# Patient Record
Sex: FEMALE | Race: OTHER | HISPANIC OR LATINO | URBAN - METROPOLITAN AREA
[De-identification: names, ages, dates, MRNs, and addresses within clinical notes are randomized per-mention and may not be internally consistent; named-entity substitution may affect disease eponyms.]

---

## 2020-02-14 ENCOUNTER — OUTPATIENT (OUTPATIENT)
Dept: OUTPATIENT SERVICES | Facility: HOSPITAL | Age: 30
LOS: 1 days | End: 2020-02-14
Payer: MEDICAID

## 2020-02-14 DIAGNOSIS — O26.899 OTHER SPECIFIED PREGNANCY RELATED CONDITIONS, UNSPECIFIED TRIMESTER: ICD-10-CM

## 2020-02-14 DIAGNOSIS — Z3A.00 WEEKS OF GESTATION OF PREGNANCY NOT SPECIFIED: ICD-10-CM

## 2020-02-14 PROCEDURE — 59025 FETAL NON-STRESS TEST: CPT

## 2020-02-14 PROCEDURE — G0463: CPT

## 2020-02-14 PROCEDURE — 59025 FETAL NON-STRESS TEST: CPT | Mod: 26

## 2020-02-16 ENCOUNTER — INPATIENT (INPATIENT)
Facility: HOSPITAL | Age: 30
LOS: 2 days | Discharge: HOME | End: 2020-02-19
Attending: OBSTETRICS & GYNECOLOGY | Admitting: OBSTETRICS & GYNECOLOGY
Payer: MEDICAID

## 2020-02-16 VITALS — HEIGHT: 60 IN | WEIGHT: 174.17 LBS

## 2020-02-16 DIAGNOSIS — O26.899 OTHER SPECIFIED PREGNANCY RELATED CONDITIONS, UNSPECIFIED TRIMESTER: ICD-10-CM

## 2020-02-16 DIAGNOSIS — Z34.80 ENCOUNTER FOR SUPERVISION OF OTHER NORMAL PREGNANCY, UNSPECIFIED TRIMESTER: ICD-10-CM

## 2020-02-16 DIAGNOSIS — Z3A.00 WEEKS OF GESTATION OF PREGNANCY NOT SPECIFIED: ICD-10-CM

## 2020-02-16 LAB
APTT BLD: 26.4 SEC — LOW (ref 27.5–36.3)
BASOPHILS # BLD AUTO: 0.06 K/UL — SIGNIFICANT CHANGE UP (ref 0–0.2)
BASOPHILS NFR BLD AUTO: 0.5 % — SIGNIFICANT CHANGE UP (ref 0–2)
EOSINOPHIL # BLD AUTO: 0.14 K/UL — SIGNIFICANT CHANGE UP (ref 0–0.5)
EOSINOPHIL NFR BLD AUTO: 1.1 % — SIGNIFICANT CHANGE UP (ref 0–6)
FIBRINOGEN PPP-MCNC: 802 MG/DL — HIGH (ref 350–510)
HCT VFR BLD CALC: 40 % — SIGNIFICANT CHANGE UP (ref 34.5–45)
HGB BLD-MCNC: 13.4 G/DL — SIGNIFICANT CHANGE UP (ref 11.5–15.5)
IMM GRANULOCYTES NFR BLD AUTO: 0.3 % — SIGNIFICANT CHANGE UP (ref 0–1.5)
INR BLD: 0.89 RATIO — SIGNIFICANT CHANGE UP (ref 0.88–1.16)
LYMPHOCYTES # BLD AUTO: 26.2 % — SIGNIFICANT CHANGE UP (ref 13–44)
LYMPHOCYTES # BLD AUTO: 3.2 K/UL — SIGNIFICANT CHANGE UP (ref 1–3.3)
MCHC RBC-ENTMCNC: 31.3 PG — SIGNIFICANT CHANGE UP (ref 27–34)
MCHC RBC-ENTMCNC: 33.5 GM/DL — SIGNIFICANT CHANGE UP (ref 32–36)
MCV RBC AUTO: 93.5 FL — SIGNIFICANT CHANGE UP (ref 80–100)
MONOCYTES # BLD AUTO: 1.05 K/UL — HIGH (ref 0–0.9)
MONOCYTES NFR BLD AUTO: 8.6 % — SIGNIFICANT CHANGE UP (ref 2–14)
NEUTROPHILS # BLD AUTO: 7.73 K/UL — HIGH (ref 1.8–7.4)
NEUTROPHILS NFR BLD AUTO: 63.3 % — SIGNIFICANT CHANGE UP (ref 43–77)
NRBC # BLD: 0 /100 WBCS — SIGNIFICANT CHANGE UP (ref 0–0)
PLATELET # BLD AUTO: 276 K/UL — SIGNIFICANT CHANGE UP (ref 150–400)
PROTHROM AB SERPL-ACNC: 9.8 SEC — LOW (ref 10–12.9)
RBC # BLD: 4.28 M/UL — SIGNIFICANT CHANGE UP (ref 3.8–5.2)
RBC # FLD: 14.3 % — SIGNIFICANT CHANGE UP (ref 10.3–14.5)
WBC # BLD: 12.22 K/UL — HIGH (ref 3.8–10.5)
WBC # FLD AUTO: 12.22 K/UL — HIGH (ref 3.8–10.5)

## 2020-02-16 RX ORDER — OXYTOCIN 10 UNIT/ML
333.33 VIAL (ML) INJECTION
Qty: 20 | Refills: 0 | Status: DISCONTINUED | OUTPATIENT
Start: 2020-02-16 | End: 2020-02-19

## 2020-02-16 RX ORDER — CITRIC ACID/SODIUM CITRATE 300-500 MG
15 SOLUTION, ORAL ORAL EVERY 6 HOURS
Refills: 0 | Status: DISCONTINUED | OUTPATIENT
Start: 2020-02-16 | End: 2020-02-17

## 2020-02-16 RX ORDER — SODIUM CHLORIDE 9 MG/ML
1000 INJECTION, SOLUTION INTRAVENOUS
Refills: 0 | Status: DISCONTINUED | OUTPATIENT
Start: 2020-02-16 | End: 2020-02-17

## 2020-02-16 RX ADMIN — SODIUM CHLORIDE 125 MILLILITER(S): 9 INJECTION, SOLUTION INTRAVENOUS at 23:27

## 2020-02-16 RX ADMIN — SODIUM CHLORIDE 125 MILLILITER(S): 9 INJECTION, SOLUTION INTRAVENOUS at 21:00

## 2020-02-17 LAB
BASOPHILS # BLD AUTO: 0.04 K/UL — SIGNIFICANT CHANGE UP (ref 0–0.2)
BASOPHILS NFR BLD AUTO: 0.3 % — SIGNIFICANT CHANGE UP (ref 0–2)
EOSINOPHIL # BLD AUTO: 0.1 K/UL — SIGNIFICANT CHANGE UP (ref 0–0.5)
EOSINOPHIL NFR BLD AUTO: 0.7 % — SIGNIFICANT CHANGE UP (ref 0–6)
HCT VFR BLD CALC: 35.7 % — SIGNIFICANT CHANGE UP (ref 34.5–45)
HGB BLD-MCNC: 11.5 G/DL — SIGNIFICANT CHANGE UP (ref 11.5–15.5)
IMM GRANULOCYTES NFR BLD AUTO: 0.5 % — SIGNIFICANT CHANGE UP (ref 0–1.5)
LYMPHOCYTES # BLD AUTO: 2.82 K/UL — SIGNIFICANT CHANGE UP (ref 1–3.3)
LYMPHOCYTES # BLD AUTO: 21 % — SIGNIFICANT CHANGE UP (ref 13–44)
MCHC RBC-ENTMCNC: 30.8 PG — SIGNIFICANT CHANGE UP (ref 27–34)
MCHC RBC-ENTMCNC: 32.2 GM/DL — SIGNIFICANT CHANGE UP (ref 32–36)
MCV RBC AUTO: 95.7 FL — SIGNIFICANT CHANGE UP (ref 80–100)
MONOCYTES # BLD AUTO: 1.14 K/UL — HIGH (ref 0–0.9)
MONOCYTES NFR BLD AUTO: 8.5 % — SIGNIFICANT CHANGE UP (ref 2–14)
NEUTROPHILS # BLD AUTO: 9.23 K/UL — HIGH (ref 1.8–7.4)
NEUTROPHILS NFR BLD AUTO: 69 % — SIGNIFICANT CHANGE UP (ref 43–77)
NRBC # BLD: 0 /100 WBCS — SIGNIFICANT CHANGE UP (ref 0–0)
PLATELET # BLD AUTO: 244 K/UL — SIGNIFICANT CHANGE UP (ref 150–400)
RBC # BLD: 3.73 M/UL — LOW (ref 3.8–5.2)
RBC # FLD: 14.6 % — HIGH (ref 10.3–14.5)
T PALLIDUM AB TITR SER: NEGATIVE — SIGNIFICANT CHANGE UP
WBC # BLD: 13.4 K/UL — HIGH (ref 3.8–10.5)
WBC # FLD AUTO: 13.4 K/UL — HIGH (ref 3.8–10.5)

## 2020-02-17 RX ORDER — OXYCODONE HYDROCHLORIDE 5 MG/1
5 TABLET ORAL
Refills: 0 | Status: DISCONTINUED | OUTPATIENT
Start: 2020-02-17 | End: 2020-02-19

## 2020-02-17 RX ORDER — AER TRAVELER 0.5 G/1
1 SOLUTION RECTAL; TOPICAL EVERY 4 HOURS
Refills: 0 | Status: DISCONTINUED | OUTPATIENT
Start: 2020-02-17 | End: 2020-02-19

## 2020-02-17 RX ORDER — IBUPROFEN 200 MG
600 TABLET ORAL EVERY 6 HOURS
Refills: 0 | Status: DISCONTINUED | OUTPATIENT
Start: 2020-02-17 | End: 2020-02-19

## 2020-02-17 RX ORDER — HYDROCORTISONE 1 %
1 OINTMENT (GRAM) TOPICAL EVERY 6 HOURS
Refills: 0 | Status: DISCONTINUED | OUTPATIENT
Start: 2020-02-17 | End: 2020-02-19

## 2020-02-17 RX ORDER — LANOLIN
1 OINTMENT (GRAM) TOPICAL EVERY 6 HOURS
Refills: 0 | Status: DISCONTINUED | OUTPATIENT
Start: 2020-02-17 | End: 2020-02-19

## 2020-02-17 RX ORDER — GLYCERIN ADULT
1 SUPPOSITORY, RECTAL RECTAL AT BEDTIME
Refills: 0 | Status: DISCONTINUED | OUTPATIENT
Start: 2020-02-17 | End: 2020-02-19

## 2020-02-17 RX ORDER — PRAMOXINE HYDROCHLORIDE 150 MG/15G
1 AEROSOL, FOAM RECTAL EVERY 4 HOURS
Refills: 0 | Status: DISCONTINUED | OUTPATIENT
Start: 2020-02-17 | End: 2020-02-19

## 2020-02-17 RX ORDER — DIBUCAINE 1 %
1 OINTMENT (GRAM) RECTAL EVERY 6 HOURS
Refills: 0 | Status: DISCONTINUED | OUTPATIENT
Start: 2020-02-17 | End: 2020-02-19

## 2020-02-17 RX ORDER — SODIUM CHLORIDE 9 MG/ML
3 INJECTION INTRAMUSCULAR; INTRAVENOUS; SUBCUTANEOUS EVERY 8 HOURS
Refills: 0 | Status: DISCONTINUED | OUTPATIENT
Start: 2020-02-17 | End: 2020-02-19

## 2020-02-17 RX ORDER — SIMETHICONE 80 MG/1
80 TABLET, CHEWABLE ORAL EVERY 4 HOURS
Refills: 0 | Status: DISCONTINUED | OUTPATIENT
Start: 2020-02-17 | End: 2020-02-19

## 2020-02-17 RX ORDER — TETANUS TOXOID, REDUCED DIPHTHERIA TOXOID AND ACELLULAR PERTUSSIS VACCINE, ADSORBED 5; 2.5; 8; 8; 2.5 [IU]/.5ML; [IU]/.5ML; UG/.5ML; UG/.5ML; UG/.5ML
0.5 SUSPENSION INTRAMUSCULAR ONCE
Refills: 0 | Status: DISCONTINUED | OUTPATIENT
Start: 2020-02-17 | End: 2020-02-19

## 2020-02-17 RX ORDER — MAGNESIUM HYDROXIDE 400 MG/1
30 TABLET, CHEWABLE ORAL
Refills: 0 | Status: DISCONTINUED | OUTPATIENT
Start: 2020-02-17 | End: 2020-02-19

## 2020-02-17 RX ORDER — BENZOCAINE 10 %
1 GEL (GRAM) MUCOUS MEMBRANE EVERY 6 HOURS
Refills: 0 | Status: DISCONTINUED | OUTPATIENT
Start: 2020-02-17 | End: 2020-02-19

## 2020-02-17 RX ORDER — ACETAMINOPHEN 500 MG
975 TABLET ORAL EVERY 6 HOURS
Refills: 0 | Status: DISCONTINUED | OUTPATIENT
Start: 2020-02-17 | End: 2020-02-19

## 2020-02-17 RX ORDER — DIPHENHYDRAMINE HCL 50 MG
25 CAPSULE ORAL EVERY 6 HOURS
Refills: 0 | Status: DISCONTINUED | OUTPATIENT
Start: 2020-02-17 | End: 2020-02-19

## 2020-02-17 RX ORDER — OXYTOCIN 10 UNIT/ML
333.33 VIAL (ML) INJECTION
Qty: 20 | Refills: 0 | Status: DISCONTINUED | OUTPATIENT
Start: 2020-02-17 | End: 2020-02-19

## 2020-02-17 RX ADMIN — Medication 1 TABLET(S): at 12:35

## 2020-02-17 RX ADMIN — Medication 975 MILLIGRAM(S): at 04:13

## 2020-02-17 RX ADMIN — Medication 600 MILLIGRAM(S): at 12:35

## 2020-02-17 RX ADMIN — SODIUM CHLORIDE 3 MILLILITER(S): 9 INJECTION INTRAMUSCULAR; INTRAVENOUS; SUBCUTANEOUS at 06:14

## 2020-02-17 RX ADMIN — Medication 600 MILLIGRAM(S): at 13:30

## 2020-02-17 RX ADMIN — SODIUM CHLORIDE 3 MILLILITER(S): 9 INJECTION INTRAMUSCULAR; INTRAVENOUS; SUBCUTANEOUS at 22:28

## 2020-02-17 RX ADMIN — Medication 600 MILLIGRAM(S): at 23:40

## 2020-02-17 RX ADMIN — Medication 975 MILLIGRAM(S): at 03:43

## 2020-02-17 RX ADMIN — Medication 1000 MILLIUNIT(S)/MIN: at 02:06

## 2020-02-17 RX ADMIN — SODIUM CHLORIDE 3 MILLILITER(S): 9 INJECTION INTRAMUSCULAR; INTRAVENOUS; SUBCUTANEOUS at 14:20

## 2020-02-18 RX ADMIN — Medication 975 MILLIGRAM(S): at 22:00

## 2020-02-18 RX ADMIN — Medication 975 MILLIGRAM(S): at 21:23

## 2020-02-18 RX ADMIN — Medication 600 MILLIGRAM(S): at 16:15

## 2020-02-18 RX ADMIN — Medication 1 TABLET(S): at 11:46

## 2020-02-18 RX ADMIN — Medication 600 MILLIGRAM(S): at 05:20

## 2020-02-18 RX ADMIN — SODIUM CHLORIDE 3 MILLILITER(S): 9 INJECTION INTRAMUSCULAR; INTRAVENOUS; SUBCUTANEOUS at 05:21

## 2020-02-18 RX ADMIN — Medication 1 SPRAY(S): at 05:21

## 2020-02-18 RX ADMIN — Medication 600 MILLIGRAM(S): at 00:30

## 2020-02-18 RX ADMIN — Medication 600 MILLIGRAM(S): at 06:20

## 2020-02-18 RX ADMIN — Medication 600 MILLIGRAM(S): at 16:35

## 2020-02-18 RX ADMIN — SODIUM CHLORIDE 3 MILLILITER(S): 9 INJECTION INTRAMUSCULAR; INTRAVENOUS; SUBCUTANEOUS at 14:00

## 2020-02-18 RX ADMIN — SODIUM CHLORIDE 3 MILLILITER(S): 9 INJECTION INTRAMUSCULAR; INTRAVENOUS; SUBCUTANEOUS at 22:00

## 2020-02-18 NOTE — DISCHARGE NOTE OB - HOSPITAL COURSE
30 y/o s/p  @ 39 weeks 1 day with normal course of labor and delivery and post partum care, pt stable

## 2020-02-18 NOTE — PROGRESS NOTE ADULT - PROBLEM SELECTOR PLAN 1
-Continue pain management  -Encourage OOB and ambulation  -Continue current care  -Plan for discharge tomorrow  -d/w dr. Martinez

## 2020-02-18 NOTE — DISCHARGE NOTE OB - MEDICATION SUMMARY - MEDICATIONS TO TAKE
I will START or STAY ON the medications listed below when I get home from the hospital:    ibuprofen 600 mg oral tablet  -- 1 tab(s) by mouth every 6 hours, As Needed -for moderate pain   -- Do not take this drug if you are pregnant.  It is very important that you take or use this exactly as directed.  Do not skip doses or discontinue unless directed by your doctor.  May cause drowsiness or dizziness.  Obtain medical advice before taking any non-prescription drugs as some may affect the action of this medication.  Take with food or milk.    -- Indication: For moderate pain    ferrous sulfate 325 mg (65 mg elemental iron) oral tablet  -- 1 tab(s) by mouth once a day   -- Check with your doctor before becoming pregnant.  Do not chew, break, or crush.  May discolor urine or feces.    -- Indication: For anemia    Prenatal Multivitamins with Folic Acid 1 mg oral tablet  -- 1 tab(s) by mouth once a day  -- Indication: For Supplementation    Kiesha-Colace 50 mg-8.6 mg oral tablet  -- 2 tab(s) by mouth once a day (at bedtime)   -- Medication should be taken with plenty of water.    -- Indication: For Stool softener

## 2020-02-18 NOTE — DISCHARGE NOTE OB - CARE PROVIDER_API CALL
Karolyn Lopez)  Obstetrics and Gynecology  [address] [place] [address]  Phone: [phone]  Fax: [phone]  Follow Up Time: 1 month

## 2020-02-18 NOTE — DISCHARGE NOTE OB - MATERIALS PROVIDED
[facility] Decker Screening Program/Birth Certificate Instructions/[facility] Hearing Screen Program/Vaccinations/Shaken Baby Prevention Handout/Discharge Medication Information for Patients and Families Pocket Guide

## 2020-02-18 NOTE — DISCHARGE NOTE OB - PATIENT PORTAL LINK FT
You can access the FollowMyHealth Patient Portal offered by [facility] by registering at the following website: [email or web address]. By joining [facility]’s FollowMyHealth portal, you will also be able to view your health information using other applications (apps) compatible with our system.

## 2020-02-18 NOTE — PROGRESS NOTE ADULT - SUBJECTIVE AND OBJECTIVE BOX
Patient seen at bedside resting comfortably offers no current complaints. Ambulating and voiding without difficulty.  Passing flatus and tolerating regular diet.  both breast/bottle feeding . Denies HA, CP, SOB, N/V/D, dizziness, palpitations, worsening abdominal pain, worsening vaginal bleeding, or any other concerns.    Vital Signs Last 24 Hrs  T(C): 36.6 (2020 06:00), Max: 36.6 (2020 06:00)  T(F): 97.8 (2020 06:00), Max: 97.8 (2020 06:00)  HR: 85 (2020 06:00) (74 - 85)  BP: 119/78 (2020 06:00) (107/74 - 119/78)  BP(mean): --  RR: 18 (2020 06:00) (17 - 18)  SpO2: 97% (2020 06:00) (97% - 98%)    Physical Exam:     Gen: A&Ox 3, NAD  Chest: CTA B/L  Cardiac: S1+S2; RRR  Breast: Soft, nontender, nonengorged  Abdomen: +Bs; Soft, nontender,  ND; Fundus firm below umbilicus  Gyn: mod lochia, intact/repaired  Ext: Nontender, DTRS 2+, no worsening edema                          11.5   13.40 )-----------( 244      ( 2020 18:43 )             35.7       A/P: 29 year old PPD#1 s/p     -Continue pain management  -Encourage OOB and ambulation  -Continue current care  -Plan for discharge tomorrow  -d/w dr. Martinez

## 2020-02-19 VITALS
OXYGEN SATURATION: 97 % | TEMPERATURE: 98 F | RESPIRATION RATE: 16 BRPM | SYSTOLIC BLOOD PRESSURE: 120 MMHG | DIASTOLIC BLOOD PRESSURE: 74 MMHG | HEART RATE: 65 BPM

## 2020-02-19 PROCEDURE — 86780 TREPONEMA PALLIDUM: CPT

## 2020-02-19 PROCEDURE — 59050 FETAL MONITOR W/REPORT: CPT

## 2020-02-19 PROCEDURE — 36415 COLL VENOUS BLD VENIPUNCTURE: CPT

## 2020-02-19 PROCEDURE — 86901 BLOOD TYPING SEROLOGIC RH(D): CPT

## 2020-02-19 PROCEDURE — G0463: CPT

## 2020-02-19 PROCEDURE — 85730 THROMBOPLASTIN TIME PARTIAL: CPT

## 2020-02-19 PROCEDURE — 85027 COMPLETE CBC AUTOMATED: CPT

## 2020-02-19 PROCEDURE — 85384 FIBRINOGEN ACTIVITY: CPT

## 2020-02-19 PROCEDURE — 85610 PROTHROMBIN TIME: CPT

## 2020-02-19 PROCEDURE — 86850 RBC ANTIBODY SCREEN: CPT

## 2020-02-19 PROCEDURE — 86900 BLOOD TYPING SEROLOGIC ABO: CPT

## 2020-02-19 PROCEDURE — 59025 FETAL NON-STRESS TEST: CPT

## 2020-02-19 RX ORDER — SENNOSIDES/DOCUSATE SODIUM 8.6MG-50MG
2 TABLET ORAL
Qty: 60 | Refills: 0
Start: 2020-02-19 | End: 2020-03-19

## 2020-02-19 RX ORDER — FERROUS SULFATE 325(65) MG
1 TABLET ORAL
Qty: 30 | Refills: 0
Start: 2020-02-19 | End: 2020-03-19

## 2020-02-19 RX ORDER — IBUPROFEN 200 MG
1 TABLET ORAL
Qty: 40 | Refills: 0
Start: 2020-02-19 | End: 2020-02-28

## 2020-02-19 RX ADMIN — SODIUM CHLORIDE 3 MILLILITER(S): 9 INJECTION INTRAMUSCULAR; INTRAVENOUS; SUBCUTANEOUS at 05:54

## 2020-02-19 RX ADMIN — Medication 600 MILLIGRAM(S): at 05:55

## 2020-02-19 RX ADMIN — Medication 600 MILLIGRAM(S): at 07:33

## 2020-02-19 NOTE — PROGRESS NOTE ADULT - SUBJECTIVE AND OBJECTIVE BOX
Patient seen at bedside resting comfortably offers no current complaints.  Ambulating and voiding without difficulty.  Passing flatus and tolerating regular diet.  Pt exclusively breastfeeding.  Denies HA, CP, SOB, N/V/D, dizziness, palpitations, worsening abdominal pain, worsening vaginal bleeding, or any other concerns.      Vital Signs Last 24 Hrs  T(C): 36.8 (19 Feb 2020 05:19), Max: 36.9 (18 Feb 2020 18:44)  T(F): 98.2 (19 Feb 2020 05:19), Max: 98.5 (18 Feb 2020 18:44)  HR: 65 (19 Feb 2020 05:19) (65 - 75)  BP: 120/74 (19 Feb 2020 05:19) (116/78 - 120/74)  BP(mean): --  RR: 16 (19 Feb 2020 05:19) (16 - 16)  SpO2: 97% (19 Feb 2020 05:19) (97% - 99%)    Physical Exam:     Gen: A&Ox 3, NAD  Chest: CTA B/L  Cardiac: S1,S2; RRR  Breast: Soft, nontender, nonengorged  Abdomen: +BS; Soft, nontender, ND; Fundus firm below umbilicus  Gyn: Min lochia  Ext: Nontender, no worsening edema                          11.5   13.40 )-----------( 244      ( 17 Feb 2020 18:43 )             35.7

## 2020-02-19 NOTE — PROGRESS NOTE ADULT - PROBLEM SELECTOR PLAN 1
- Discharge home with instructions  -Follow up in office in 5-6 weeks for postpartum visit  -Breastfeeding encouraged   -d/w Dr. Martinez
